# Patient Record
(demographics unavailable — no encounter records)

---

## 2025-01-17 NOTE — PHYSICAL EXAM
[Normocephalic] : normocephalic [Atraumatic] : atraumatic [Supple] : supple [No Supraclavicular Adenopathy] : no supraclavicular adenopathy [Examined in the supine and seated position] : examined in the supine and seated position [Asymmetrical] : asymmetrical [No dominant masses] : no dominant masses in right breast  [No dominant masses] : no dominant masses left breast [No Nipple Retraction] : no left nipple retraction [No Nipple Discharge] : no left nipple discharge [No Axillary Lymphadenopathy] : no left axillary lymphadenopathy [No Edema] : no edema [No Rashes] : no rashes [No Ulceration] : no ulceration [de-identified] : R>L [de-identified] : healed lateral incision, post bx change and slightly bruised [de-identified] : left nipple 12:00 area of dry skin resolved

## 2025-01-17 NOTE — ASSESSMENT
[FreeTextEntry1] : This is a 78 she will  history of left breast DCIS, estrogen positive  The pathology and imaging results were reviewed and discussed. She is a stage IA left breast cancer (T1bN0) HER2 negative, ER+/LA+ (AJCC 8thed).  The use of multimodality therapy for the treatment of breast cancer was discussed.   Surgical options were reviewed including a lumpectomy to negative margins, with whole breast radiation therapy versus a mastectomy with or without reconstruction. Included in this discussion with the results of the NSABP-04 and 06 trials.   Mastectomy techniques were discussed in detail including skin sparing and nipple sparing techniques. Recurrence was reviewed and that mastectomy does not confer a 100% risk reduction nor guarantee against breast cancer in the future.  Reconstructive options were briefly reviewed, including implant based versus tissue flap based reconstruction options.  Referral  to a plastic surgeon was offered.  The patient was informed that breast reconstruction is covered by insurance per state and federal laws.     Axillary staging was discussed. We reviewed the sentinel lymph node procedure, and how if the sentinel lymph node is found to have metastatic disease either on the intraoperative evaluation or on the final pathology, that an axillary dissection of the remaining lymph nodes may be recommended. It was explained that an axillary dissection takes "level one and level two" lymph nodes, and also "level three" if they feel clinically suspicious. It was explained that if the sentinel lymph node was not able to be found, that an axillary dissection may be necessary.  I reviewed the risks of lymphedema for SLND (6%) versus ALND (20%). I reviewed our lymphedema monitoring program. We discussed choosing wisely campaign and Sound and [place] Trial data to support SLNE omission. She will also be presented at [facility].  The Z-0011 study was touched upon, outlining that there is evidence that it may not be necessary to complete an axillary dissection in select patients even if one or two sentinel nodes are positive for cancer, as long as the cancer in the nodes is confined to within the nodes, the nodes aren't  matted down, and if the cancer meets certain criteria including being less than 5 cm, treated by lumpectomy and conventional whole breast radiation, and the patient is planning to take recommended adjuvant therapy, and didn't require preoperative chemotherapy.     The general indications for the use of adjuvant hormonal and chemotherapy and targeted therapies were discussed. It was explained that medical treatments are dependent on pathology results including receptor studies.  It was explained that some patients have further genetic testing of their tumors before a decision about chemotherapy can be made.  The indications for radiation therapy and side effects were also discussed including the use varying lengths of whole breast radiation.  It was explained that radiation is generally reserved for lumpectomy patients, and patients with larger tumors or positive lymph nodes. Common side effects were reviewed.   The genetics of breast cancer were discussed with the implications for risk of contralateral cancer and other associated cancers, implication for ovarian risk, options for management, and implications for family members. She consented for genetic testing today.  She is strongly motivated towards breast conserving surgery. She would like to meet with Dr. Wells to discuss symmetrizing surgery. I will call her with genetic testing results and go from there.

## 2025-01-17 NOTE — CONSULT LETTER
[Dear  ___] : Dear  [unfilled], [Courtesy Letter:] : I had the pleasure of seeing your patient, [unfilled], in my office today. [Please see my note below.] : Please see my note below. [Consult Closing:] : Thank you very much for allowing me to participate in the care of this patient.  If you have any questions, please do not hesitate to contact me. [Sincerely,] : Sincerely, [DrLink  ___] : Dr. HERNANDEZ [FreeTextEntry1] : She is newly diagnosed with left breast cancer. [FreeTextEntry3] : Candace Norwood MS DO\par  Breast Surgeon\par  [facility] \[place] [address]\par

## 2025-01-17 NOTE — HISTORY OF PRESENT ILLNESS
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 Patient is s/p RTX. Patient did not have hormonal therapy. She is positive for anticardiolipin antibody. Patient is s/p left excision biopsy for an intraductal papilloma in 2006. She also states she had an excisional biopsy on her right at [facility] around 1999. She is of [place] descent from [facility]. She has 3 children 1 son, 2 daughters, 2 grandchildren. She is a retired  who still works intermittently. Patient's daughter diagnosed with BCA at age of 49 in Dec 2015.   Patient has no breast complaints today.  She completed Moderna left arm March 2021. She is s/p right breast MRI bx retro 5/26/2021 pathology showed UDH, DSF, proliferative changes without atypia (buckle and stoplight marker). She had a fall in March 2021 at home and has been having right leg dysfunction. She is following with her orthopedic surgery Dr. Gee.  She has 3-4 espresso daily. She has lost her brother, brother-in-law, cousin, sister and sister-in law (passed of ALS). They passed of lung related issues due to heavy smoking. She is feeling, understandably, depressed from all this loss in 1 year.  She states she has no breast concerns today. Has musculoskeletal issues for which she is undergoing work up with Dr. Gee.  01/14/2025 - Lyn presents today to discuss newly diagnosed invasive ductal carcinoma of the left breast. Her screening mammogram and ultrasound for density (12/09/2024) showed heterogeneously dense breast tissue and a 7 x 7 mm developing mass in the left breast 3:00 N7 on mammogram. Ultrasound findings showed a left 3:00 N6 7 x 5 6 mm ill-defined hypoechoic mass. Ultrasound biopsy was recommended and done on 12/27/2024. Pathology of left 30:00 N6 (wing-shaped clip) showed IDC, grade 6/9, ER strongly positive in >90%, MS strongly positive in >90%, Her 2 negative and Ki67 11%.  Pathology also showed atypical lobular hyperplasia.  She presents with her son and daughter.   She does SBE occasionally.  She has not noticed a change in her breast or a breast lump. She has not noticed a change in her nipple or nipple area. She has not noticed a change in the skin of the breast. She is not experiencing nipple discharge. She is not experiencing breast pain. Patient complains of right breast occasional "pulling" She has not noticed a lump or lymph node under the armpit.   BREAST CANCER RISK FACTORS Menarche: 12 Menopause: 50 Grav:   3   Para: 3 Age at first live birth:20 Nursed:Y Hysterectomy: Y at 40 Oophorectomy:  OCP:N HRT: Y for 5 years Last pap/pelvic exam: 11/2019 WNL Related family history: daughter BCA @ 49 (lia), Martina bca @52yo Ashkenazi: N Matt-Ricardo/NCI lifetime risk: N/A BRCA testing: martina SEGAL genetics negative; micky (son) genetics negative  Last mammogram:  12/09/2024                             Location: [facility] Report reviewed. Results: Birads 4 The breasts are heterogeneously dense. Left 3:00 N7 7 x7 mm developing mass. Right breast WNL Arterial calcifications again present which could coronary artery disease.    Last ultrasound:  12/09/2025                             Location: [facility] Report reviewed Results: Birads 4 Left 3:00 N6 7 x 5 6 mm ill-defined hypoechoic mass correlating with mammogram findings. Rec: ultrasound biopsy  Last MRI:  5/5/2021                                            Location: [facility] Report Reviewed Results: right breast: new foci of retroareolar nodular enhancement; MR guided biopsy recommended. Fibrocystic changes. BIRADS 4 left breast: stable postsurgical changes. No suspicious findings. BIRADS 2

## 2025-01-17 NOTE — CONSULT LETTER
[Dear  ___] : Dear  [unfilled], [Courtesy Letter:] : I had the pleasure of seeing your patient, [unfilled], in my office today. [Please see my note below.] : Please see my note below. [Consult Closing:] : Thank you very much for allowing me to participate in the care of this patient.  If you have any questions, please do not hesitate to contact me. [Sincerely,] : Sincerely, [DrLink  ___] : Dr. HERNANDEZ [FreeTextEntry1] : She is newly diagnosed with left breast cancer. [FreeTextEntry3] : Candace Norwood MS DO\par  Breast Surgeon\par  [facility] \[place] [address]\par

## 2025-01-17 NOTE — CONSULT LETTER
[Dear  ___] : Dear  [unfilled], [Courtesy Letter:] : I had the pleasure of seeing your patient, [unfilled], in my office today. [Please see my note below.] : Please see my note below. [Consult Closing:] : Thank you very much for allowing me to participate in the care of this patient.  If you have any questions, please do not hesitate to contact me. [Sincerely,] : Sincerely, [DrLink  ___] : Dr. HERNANDEZ [FreeTextEntry1] : She is newly diagnosed with left breast cancer. [FreeTextEntry3] : Candace Norwood MS DO\par  Breast Surgeon\par  [facility] \[place] [address]\par

## 2025-01-17 NOTE — HISTORY OF PRESENT ILLNESS
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 Patient is s/p RTX. Patient did not have hormonal therapy. She is positive for anticardiolipin antibody. Patient is s/p left excision biopsy for an intraductal papilloma in 2006. She also states she had an excisional biopsy on her right at [facility] around 1999. She is of [place] descent from [facility]. She has 3 children 1 son, 2 daughters, 2 grandchildren. She is a retired  who still works intermittently. Patient's daughter diagnosed with BCA at age of 49 in Dec 2015.   Patient has no breast complaints today.  She completed Moderna left arm March 2021. She is s/p right breast MRI bx retro 5/26/2021 pathology showed UDH, DSF, proliferative changes without atypia (buckle and stoplight marker). She had a fall in March 2021 at home and has been having right leg dysfunction. She is following with her orthopedic surgery Dr. Gee.  She has 3-4 espresso daily. She has lost her brother, brother-in-law, cousin, sister and sister-in law (passed of ALS). They passed of lung related issues due to heavy smoking. She is feeling, understandably, depressed from all this loss in 1 year.  She states she has no breast concerns today. Has musculoskeletal issues for which she is undergoing work up with Dr. Gee.  01/14/2025 - Lyn presents today to discuss newly diagnosed invasive ductal carcinoma of the left breast. Her screening mammogram and ultrasound for density (12/09/2024) showed heterogeneously dense breast tissue and a 7 x 7 mm developing mass in the left breast 3:00 N7 on mammogram. Ultrasound findings showed a left 3:00 N6 7 x 5 6 mm ill-defined hypoechoic mass. Ultrasound biopsy was recommended and done on 12/27/2024. Pathology of left 30:00 N6 (wing-shaped clip) showed IDC, grade 6/9, ER strongly positive in >90%, NC strongly positive in >90%, Her 2 negative and Ki67 11%.  Pathology also showed atypical lobular hyperplasia.  She presents with her son and daughter.   She does SBE occasionally.  She has not noticed a change in her breast or a breast lump. She has not noticed a change in her nipple or nipple area. She has not noticed a change in the skin of the breast. She is not experiencing nipple discharge. She is not experiencing breast pain. Patient complains of right breast occasional "pulling" She has not noticed a lump or lymph node under the armpit.   BREAST CANCER RISK FACTORS Menarche: 12 Menopause: 50 Grav:   3   Para: 3 Age at first live birth:20 Nursed:Y Hysterectomy: Y at 40 Oophorectomy:  OCP:N HRT: Y for 5 years Last pap/pelvic exam: 11/2019 WNL Related family history: daughter BCA @ 49 (lia), Martina bca @52yo Ashkenazi: N Matt-Ricardo/NCI lifetime risk: N/A BRCA testing: martina SEGAL genetics negative; micky (son) genetics negative  Last mammogram:  12/09/2024                             Location: [facility] Report reviewed. Results: Birads 4 The breasts are heterogeneously dense. Left 3:00 N7 7 x7 mm developing mass. Right breast WNL Arterial calcifications again present which could coronary artery disease.    Last ultrasound:  12/09/2025                             Location: [facility] Report reviewed Results: Birads 4 Left 3:00 N6 7 x 5 6 mm ill-defined hypoechoic mass correlating with mammogram findings. Rec: ultrasound biopsy  Last MRI:  5/5/2021                                            Location: [facility] Report Reviewed Results: right breast: new foci of retroareolar nodular enhancement; MR guided biopsy recommended. Fibrocystic changes. BIRADS 4 left breast: stable postsurgical changes. No suspicious findings. BIRADS 2

## 2025-01-17 NOTE — HISTORY OF PRESENT ILLNESS
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 Patient is s/p RTX. Patient did not have hormonal therapy. She is positive for anticardiolipin antibody. Patient is s/p left excision biopsy for an intraductal papilloma in 2006. She also states she had an excisional biopsy on her right at [facility] around 1999. She is of [place] descent from [facility]. She has 3 children 1 son, 2 daughters, 2 grandchildren. She is a retired  who still works intermittently. Patient's daughter diagnosed with BCA at age of 49 in Dec 2015.   Patient has no breast complaints today.  She completed Moderna left arm March 2021. She is s/p right breast MRI bx retro 5/26/2021 pathology showed UDH, DSF, proliferative changes without atypia (buckle and stoplight marker). She had a fall in March 2021 at home and has been having right leg dysfunction. She is following with her orthopedic surgery Dr. Gee.  She has 3-4 espresso daily. She has lost her brother, brother-in-law, cousin, sister and sister-in law (passed of ALS). They passed of lung related issues due to heavy smoking. She is feeling, understandably, depressed from all this loss in 1 year.  She states she has no breast concerns today. Has musculoskeletal issues for which she is undergoing work up with Dr. Gee.  01/14/2025 - Lyn presents today to discuss newly diagnosed invasive ductal carcinoma of the left breast. Her screening mammogram and ultrasound for density (12/09/2024) showed heterogeneously dense breast tissue and a 7 x 7 mm developing mass in the left breast 3:00 N7 on mammogram. Ultrasound findings showed a left 3:00 N6 7 x 5 6 mm ill-defined hypoechoic mass. Ultrasound biopsy was recommended and done on 12/27/2024. Pathology of left 30:00 N6 (wing-shaped clip) showed IDC, grade 6/9, ER strongly positive in >90%, LA strongly positive in >90%, Her 2 negative and Ki67 11%.  Pathology also showed atypical lobular hyperplasia.  She presents with her son and daughter.   She does SBE occasionally.  She has not noticed a change in her breast or a breast lump. She has not noticed a change in her nipple or nipple area. She has not noticed a change in the skin of the breast. She is not experiencing nipple discharge. She is not experiencing breast pain. Patient complains of right breast occasional "pulling" She has not noticed a lump or lymph node under the armpit.   BREAST CANCER RISK FACTORS Menarche: 12 Menopause: 50 Grav:   3   Para: 3 Age at first live birth:20 Nursed:Y Hysterectomy: Y at 40 Oophorectomy:  OCP:N HRT: Y for 5 years Last pap/pelvic exam: 11/2019 WNL Related family history: daughter BCA @ 49 (lia), Martina bca @52yo Ashkenazi: N Matt-Ricardo/NCI lifetime risk: N/A BRCA testing: martina SEGAL genetics negative; micky (son) genetics negative  Last mammogram:  12/09/2024                             Location: [facility] Report reviewed. Results: Birads 4 The breasts are heterogeneously dense. Left 3:00 N7 7 x7 mm developing mass. Right breast WNL Arterial calcifications again present which could coronary artery disease.    Last ultrasound:  12/09/2025                             Location: [facility] Report reviewed Results: Birads 4 Left 3:00 N6 7 x 5 6 mm ill-defined hypoechoic mass correlating with mammogram findings. Rec: ultrasound biopsy  Last MRI:  5/5/2021                                            Location: [facility] Report Reviewed Results: right breast: new foci of retroareolar nodular enhancement; MR guided biopsy recommended. Fibrocystic changes. BIRADS 4 left breast: stable postsurgical changes. No suspicious findings. BIRADS 2

## 2025-01-17 NOTE — REVIEW OF SYSTEMS
[Feeling Tired] : feeling tired [Snoring] : snoring [Abdominal Pain] : abdominal pain [Joint Swelling] : joint swelling [Negative] : Heme/Lymph [FreeTextEntry5] : Blood clots

## 2025-01-17 NOTE — ASSESSMENT
[FreeTextEntry1] : This is a 78 she will  history of left breast DCIS, estrogen positive  The pathology and imaging results were reviewed and discussed. She is a stage IA left breast cancer (T1bN0) HER2 negative, ER+/DE+ (AJCC 8thed).  The use of multimodality therapy for the treatment of breast cancer was discussed.   Surgical options were reviewed including a lumpectomy to negative margins, with whole breast radiation therapy versus a mastectomy with or without reconstruction. Included in this discussion with the results of the NSABP-04 and 06 trials.   Mastectomy techniques were discussed in detail including skin sparing and nipple sparing techniques. Recurrence was reviewed and that mastectomy does not confer a 100% risk reduction nor guarantee against breast cancer in the future.  Reconstructive options were briefly reviewed, including implant based versus tissue flap based reconstruction options.  Referral  to a plastic surgeon was offered.  The patient was informed that breast reconstruction is covered by insurance per state and federal laws.     Axillary staging was discussed. We reviewed the sentinel lymph node procedure, and how if the sentinel lymph node is found to have metastatic disease either on the intraoperative evaluation or on the final pathology, that an axillary dissection of the remaining lymph nodes may be recommended. It was explained that an axillary dissection takes "level one and level two" lymph nodes, and also "level three" if they feel clinically suspicious. It was explained that if the sentinel lymph node was not able to be found, that an axillary dissection may be necessary.  I reviewed the risks of lymphedema for SLND (6%) versus ALND (20%). I reviewed our lymphedema monitoring program. We discussed choosing wisely campaign and Sound and [place] Trial data to support SLNE omission. She will also be presented at [facility].  The Z-0011 study was touched upon, outlining that there is evidence that it may not be necessary to complete an axillary dissection in select patients even if one or two sentinel nodes are positive for cancer, as long as the cancer in the nodes is confined to within the nodes, the nodes aren't  matted down, and if the cancer meets certain criteria including being less than 5 cm, treated by lumpectomy and conventional whole breast radiation, and the patient is planning to take recommended adjuvant therapy, and didn't require preoperative chemotherapy.     The general indications for the use of adjuvant hormonal and chemotherapy and targeted therapies were discussed. It was explained that medical treatments are dependent on pathology results including receptor studies.  It was explained that some patients have further genetic testing of their tumors before a decision about chemotherapy can be made.  The indications for radiation therapy and side effects were also discussed including the use varying lengths of whole breast radiation.  It was explained that radiation is generally reserved for lumpectomy patients, and patients with larger tumors or positive lymph nodes. Common side effects were reviewed.   The genetics of breast cancer were discussed with the implications for risk of contralateral cancer and other associated cancers, implication for ovarian risk, options for management, and implications for family members. She consented for genetic testing today.  She is strongly motivated towards breast conserving surgery. She would like to meet with Dr. Wells to discuss symmetrizing surgery. I will call her with genetic testing results and go from there.

## 2025-01-17 NOTE — ASSESSMENT
[FreeTextEntry1] : This is a 78 she will  history of left breast DCIS, estrogen positive  The pathology and imaging results were reviewed and discussed. She is a stage IA left breast cancer (T1bN0) HER2 negative, ER+/RI+ (AJCC 8thed).  The use of multimodality therapy for the treatment of breast cancer was discussed.   Surgical options were reviewed including a lumpectomy to negative margins, with whole breast radiation therapy versus a mastectomy with or without reconstruction. Included in this discussion with the results of the NSABP-04 and 06 trials.   Mastectomy techniques were discussed in detail including skin sparing and nipple sparing techniques. Recurrence was reviewed and that mastectomy does not confer a 100% risk reduction nor guarantee against breast cancer in the future.  Reconstructive options were briefly reviewed, including implant based versus tissue flap based reconstruction options.  Referral  to a plastic surgeon was offered.  The patient was informed that breast reconstruction is covered by insurance per state and federal laws.     Axillary staging was discussed. We reviewed the sentinel lymph node procedure, and how if the sentinel lymph node is found to have metastatic disease either on the intraoperative evaluation or on the final pathology, that an axillary dissection of the remaining lymph nodes may be recommended. It was explained that an axillary dissection takes "level one and level two" lymph nodes, and also "level three" if they feel clinically suspicious. It was explained that if the sentinel lymph node was not able to be found, that an axillary dissection may be necessary.  I reviewed the risks of lymphedema for SLND (6%) versus ALND (20%). I reviewed our lymphedema monitoring program. We discussed choosing wisely campaign and Sound and [place] Trial data to support SLNE omission. She will also be presented at [facility].  The Z-0011 study was touched upon, outlining that there is evidence that it may not be necessary to complete an axillary dissection in select patients even if one or two sentinel nodes are positive for cancer, as long as the cancer in the nodes is confined to within the nodes, the nodes aren't  matted down, and if the cancer meets certain criteria including being less than 5 cm, treated by lumpectomy and conventional whole breast radiation, and the patient is planning to take recommended adjuvant therapy, and didn't require preoperative chemotherapy.     The general indications for the use of adjuvant hormonal and chemotherapy and targeted therapies were discussed. It was explained that medical treatments are dependent on pathology results including receptor studies.  It was explained that some patients have further genetic testing of their tumors before a decision about chemotherapy can be made.  The indications for radiation therapy and side effects were also discussed including the use varying lengths of whole breast radiation.  It was explained that radiation is generally reserved for lumpectomy patients, and patients with larger tumors or positive lymph nodes. Common side effects were reviewed.   The genetics of breast cancer were discussed with the implications for risk of contralateral cancer and other associated cancers, implication for ovarian risk, options for management, and implications for family members. She consented for genetic testing today.  She is strongly motivated towards breast conserving surgery. She would like to meet with Dr. Wells to discuss symmetrizing surgery. I will call her with genetic testing results and go from there.

## 2025-01-17 NOTE — HISTORY OF PRESENT ILLNESS
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 Patient is s/p RTX. Patient did not have hormonal therapy. She is positive for anticardiolipin antibody. Patient is s/p left excision biopsy for an intraductal papilloma in 2006. She also states she had an excisional biopsy on her right at [facility] around 1999. She is of [place] descent from [facility]. She has 3 children 1 son, 2 daughters, 2 grandchildren. She is a retired  who still works intermittently. Patient's daughter diagnosed with BCA at age of 49 in Dec 2015.   Patient has no breast complaints today.  She completed Moderna left arm March 2021. She is s/p right breast MRI bx retro 5/26/2021 pathology showed UDH, DSF, proliferative changes without atypia (buckle and stoplight marker). She had a fall in March 2021 at home and has been having right leg dysfunction. She is following with her orthopedic surgery Dr. Gee.  She has 3-4 espresso daily. She has lost her brother, brother-in-law, cousin, sister and sister-in law (passed of ALS). They passed of lung related issues due to heavy smoking. She is feeling, understandably, depressed from all this loss in 1 year.  She states she has no breast concerns today. Has musculoskeletal issues for which she is undergoing work up with Dr. Gee.  01/14/2025 - Lyn presents today to discuss newly diagnosed invasive ductal carcinoma of the left breast. Her screening mammogram and ultrasound for density (12/09/2024) showed heterogeneously dense breast tissue and a 7 x 7 mm developing mass in the left breast 3:00 N7 on mammogram. Ultrasound findings showed a left 3:00 N6 7 x 5 6 mm ill-defined hypoechoic mass. Ultrasound biopsy was recommended and done on 12/27/2024. Pathology of left 30:00 N6 (wing-shaped clip) showed IDC, grade 6/9, ER strongly positive in >90%, ND strongly positive in >90%, Her 2 negative and Ki67 11%.  Pathology also showed atypical lobular hyperplasia.  She presents with her son and daughter.   She does SBE occasionally.  She has not noticed a change in her breast or a breast lump. She has not noticed a change in her nipple or nipple area. She has not noticed a change in the skin of the breast. She is not experiencing nipple discharge. She is not experiencing breast pain. Patient complains of right breast occasional "pulling" She has not noticed a lump or lymph node under the armpit.   BREAST CANCER RISK FACTORS Menarche: 12 Menopause: 50 Grav:   3   Para: 3 Age at first live birth:20 Nursed:Y Hysterectomy: Y at 40 Oophorectomy:  OCP:N HRT: Y for 5 years Last pap/pelvic exam: 11/2019 WNL Related family history: daughter BCA @ 49 (lia), Martina bca @54yo Ashkenazi: N Matt-Ricardo/NCI lifetime risk: N/A BRCA testing: martina SEGAL genetics negative; micky (son) genetics negative  Last mammogram:  12/09/2024                             [facility]: [facility] Report reviewed. Results: Birads 4 The breasts are heterogeneously dense. Left 3:00 N7 7 x7 mm developing mass. Right breast WNL Arterial calcifications again present which could coronary artery disease.    Last ultrasound:  12/09/2025                             Location: [facility] Report reviewed Results: Birads 4 Left 3:00 N6 7 x 5 6 mm ill-defined hypoechoic mass correlating with mammogram findings. Rec: ultrasound biopsy  Last MRI:  5/5/2021                                            Location: [facility] Report Reviewed Results: right breast: new foci of retroareolar nodular enhancement; MR guided biopsy recommended. Fibrocystic changes. BIRADS 4 left breast: stable postsurgical changes. No suspicious findings. BIRADS 2

## 2025-01-17 NOTE — PHYSICAL EXAM
[Normocephalic] : normocephalic [Atraumatic] : atraumatic [Supple] : supple [No Supraclavicular Adenopathy] : no supraclavicular adenopathy [Examined in the supine and seated position] : examined in the supine and seated position [Asymmetrical] : asymmetrical [No dominant masses] : no dominant masses in right breast  [No dominant masses] : no dominant masses left breast [No Nipple Retraction] : no left nipple retraction [No Nipple Discharge] : no left nipple discharge [No Axillary Lymphadenopathy] : no left axillary lymphadenopathy [No Edema] : no edema [No Rashes] : no rashes [No Ulceration] : no ulceration [de-identified] : R>L [de-identified] : healed lateral incision, post bx change and slightly bruised [de-identified] : left nipple 12:00 area of dry skin resolved

## 2025-01-17 NOTE — ASSESSMENT
[FreeTextEntry1] : This is a 78 she will  history of left breast DCIS, estrogen positive  The pathology and imaging results were reviewed and discussed. She is a stage IA left breast cancer (T1bN0) HER2 negative, ER+/MT+ (AJCC 8thed).  The use of multimodality therapy for the treatment of breast cancer was discussed.   Surgical options were reviewed including a lumpectomy to negative margins, with whole breast radiation therapy versus a mastectomy with or without reconstruction. Included in this discussion with the results of the NSABP-04 and 06 trials.   Mastectomy techniques were discussed in detail including skin sparing and nipple sparing techniques. Recurrence was reviewed and that mastectomy does not confer a 100% risk reduction nor guarantee against breast cancer in the future.  Reconstructive options were briefly reviewed, including implant based versus tissue flap based reconstruction options.  Referral  to a plastic surgeon was offered.  The patient was informed that breast reconstruction is covered by insurance per state and federal laws.     Axillary staging was discussed. We reviewed the sentinel lymph node procedure, and how if the sentinel lymph node is found to have metastatic disease either on the intraoperative evaluation or on the final pathology, that an axillary dissection of the remaining lymph nodes may be recommended. It was explained that an axillary dissection takes "level one and level two" lymph nodes, and also "level three" if they feel clinically suspicious. It was explained that if the sentinel lymph node was not able to be found, that an axillary dissection may be necessary.  I reviewed the risks of lymphedema for SLND (6%) versus ALND (20%). I reviewed our lymphedema monitoring program. We discussed choosing wisely campaign and Sound and [place] Trial data to support SLNE omission. She will also be presented at [facility].  The Z-0011 study was touched upon, outlining that there is evidence that it may not be necessary to complete an axillary dissection in select patients even if one or two sentinel nodes are positive for cancer, as long as the cancer in the nodes is confined to within the nodes, the nodes aren't  matted down, and if the cancer meets certain criteria including being less than 5 cm, treated by lumpectomy and conventional whole breast radiation, and the patient is planning to take recommended adjuvant therapy, and didn't require preoperative chemotherapy.     The general indications for the use of adjuvant hormonal and chemotherapy and targeted therapies were discussed. It was explained that medical treatments are dependent on pathology results including receptor studies.  It was explained that some patients have further genetic testing of their tumors before a decision about chemotherapy can be made.  The indications for radiation therapy and side effects were also discussed including the use varying lengths of whole breast radiation.  It was explained that radiation is generally reserved for lumpectomy patients, and patients with larger tumors or positive lymph nodes. Common side effects were reviewed.   The genetics of breast cancer were discussed with the implications for risk of contralateral cancer and other associated cancers, implication for ovarian risk, options for management, and implications for family members. She consented for genetic testing today.  She is strongly motivated towards breast conserving surgery. She would like to meet with Dr. Wells to discuss symmetrizing surgery. I will call her with genetic testing results and go from there.

## 2025-01-17 NOTE — PHYSICAL EXAM
[Normocephalic] : normocephalic [Atraumatic] : atraumatic [Supple] : supple [No Supraclavicular Adenopathy] : no supraclavicular adenopathy [Examined in the supine and seated position] : examined in the supine and seated position [Asymmetrical] : asymmetrical [No dominant masses] : no dominant masses in right breast  [No dominant masses] : no dominant masses left breast [No Nipple Retraction] : no left nipple retraction [No Nipple Discharge] : no left nipple discharge [No Axillary Lymphadenopathy] : no left axillary lymphadenopathy [No Edema] : no edema [No Rashes] : no rashes [No Ulceration] : no ulceration [de-identified] : R>L [de-identified] : healed lateral incision, post bx change and slightly bruised [de-identified] : left nipple 12:00 area of dry skin resolved

## 2025-01-17 NOTE — CONSULT LETTER
[Dear  ___] : Dear  [unfilled], [Courtesy Letter:] : I had the pleasure of seeing your patient, [unfilled], in my office today. [Please see my note below.] : Please see my note below. [Consult Closing:] : Thank you very much for allowing me to participate in the care of this patient.  If you have any questions, please do not hesitate to contact me. [Sincerely,] : Sincerely, [DrLink  ___] : Dr. HERNANDEZ [FreeTextEntry1] : She is newly diagnosed with left breast cancer. [FreeTextEntry3] : Candace Norwood MS DO\par  Breast Surgeon\par  [facility] \[place] [address]\par

## 2025-01-21 NOTE — HISTORY OF PRESENT ILLNESS
[FreeTextEntry1] : Ms. Napier presents for initial consultation to discuss breast reconstruction at the time of left partial mastectomy for newly diagnosed invasive ductal carcinoma of the left breast, referred by Dr. Norwood. She has had prior segmental resection and radiation treatments to the left breast 21 years ago. She is positive for anticardiolipin antibody, has a history of lower extremity DVT. Genetic testing is pending.   Left breast soft, higher than right breast   Plan: Today we discussed local tissue rearrangement at the time of left partial mastectomy. She wishes to decrease the scope of surgery and is not interested in right breast reduction for symmetry.  The nature of surgery, its risks, benefits, alternatives, expected postoperative course, recovery and long-term results were discussed in detail. All questions were answered. Will coordinate care with Dr. Norwood.

## 2025-02-08 NOTE — HISTORY OF PRESENT ILLNESS
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 Patient is s/p RTX. Patient did not have hormonal therapy. She is positive for anticardiolipin antibody. Patient is s/p left excision biopsy for an intraductal papilloma in 2006. She also states she had an excisional biopsy on her right at [facility] around 1999. She is of [place] descent from [facility]. She has 3 children 1 son, 2 daughters, 2 grandchildren. She is a retired  who still works intermittently. Patient's daughter diagnosed with BCA at age of 49 in Dec 2015.   Patient has no breast complaints today.  She completed Moderna left arm March 2021. She is s/p right breast MRI bx retro 5/26/2021 pathology showed UDH, DSF, proliferative changes without atypia (buckle and stoplight marker). She had a fall in March 2021 at home and has been having right leg dysfunction. She is following with her orthopedic surgery Dr. Gee.  She has 3-4 espresso daily. She has lost her brother, brother-in-law, cousin, sister and sister-in law (passed of ALS). They passed of lung related issues due to heavy smoking. She is feeling, understandably, depressed from all this loss in 1 year.  She states she has no breast concerns today. Has musculoskeletal issues for which she is undergoing work up with Dr. Gee.  01/14/2025 - Lyn presents today to discuss newly diagnosed invasive ductal carcinoma of the left breast. Her screening mammogram and ultrasound for density (12/09/2024) showed heterogeneously dense breast tissue and a 7 x 7 mm developing mass in the left breast 3:00 N7 on mammogram. Ultrasound findings showed a left 3:00 N6 7 x 5 6 mm ill-defined hypoechoic mass. Ultrasound biopsy was recommended and done on 12/27/2024. Pathology of left 30:00 N6 (wing-shaped clip) showed IDC, grade 6/9, ER strongly positive in >90%, CT strongly positive in >90%, Her 2 negative and Ki67 11%.  Pathology also showed atypical lobular hyperplasia.    She is now s/p Left Pmtx/slne on 1/30/2025. Final pathology reported as 0/2 LN, IDC, 11mm, angio-lymphatic invasion seen, DCIS intermediated grade, LCIS, margins negative, ER/CT+, Her2-, Ki67 5-10%.  She is doing well postop. she has occasionally felt a right breast pulling.  She does SBE occasionally.  She has not noticed a change in her breast or a breast lump. She has not noticed a change in her nipple or nipple area. She has not noticed a change in the skin of the breast. She is not experiencing nipple discharge. She is not experiencing breast pain. Patient complains of right breast occasional "pulling" She has not noticed a lump or lymph node under the armpit.   BREAST CANCER RISK FACTORS Menarche: 12 Menopause: 50 Grav:   3   Para: 3 Age at first live birth:20 Nursed:Y Hysterectomy: Y at 40 Oophorectomy:  OCP:N HRT: Y for 5 years Last pap/pelvic exam: 11/2019 WNL Related family history: daughter BCA @ 49 (lia), Martina bca @54yo Ashkenazi: N Matt-Ricardo/NCI lifetime risk: N/A BRCA testing: martina SEGAL genetics negative; micky (son) genetics negative  Last mammogram:  12/09/2024                             Location: [facility] Report reviewed. Results: Birads 4 The breasts are heterogeneously dense. Left 3:00 N7 7 x7 mm developing mass. Right breast WNL Arterial calcifications again present which could coronary artery disease.    Last ultrasound:  12/09/2025                             Location: [facility] Report reviewed Results: Birads 4 Left 3:00 N6 7 x 5 6 mm ill-defined hypoechoic mass correlating with mammogram findings. Rec: ultrasound biopsy  Last MRI:  5/5/2021                                            Location: [facility] Report Reviewed Results: right breast: new foci of retroareolar nodular enhancement; MR guided biopsy recommended. Fibrocystic changes. BIRADS 4 left breast: stable postsurgical changes. No suspicious findings. BIRADS 2

## 2025-02-08 NOTE — HISTORY OF PRESENT ILLNESS
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 Patient is s/p RTX. Patient did not have hormonal therapy. She is positive for anticardiolipin antibody. Patient is s/p left excision biopsy for an intraductal papilloma in 2006. She also states she had an excisional biopsy on her right at [facility] around 1999. She is of [place] descent from [facility]. She has 3 children 1 son, 2 daughters, 2 grandchildren. She is a retired  who still works intermittently. Patient's daughter diagnosed with BCA at age of 49 in Dec 2015.   Patient has no breast complaints today.  She completed Moderna left arm March 2021. She is s/p right breast MRI bx retro 5/26/2021 pathology showed UDH, DSF, proliferative changes without atypia (buckle and stoplight marker). She had a fall in March 2021 at home and has been having right leg dysfunction. She is following with her orthopedic surgery Dr. Gee.  She has 3-4 espresso daily. She has lost her brother, brother-in-law, cousin, sister and sister-in law (passed of ALS). They passed of lung related issues due to heavy smoking. She is feeling, understandably, depressed from all this loss in 1 year.  She states she has no breast concerns today. Has musculoskeletal issues for which she is undergoing work up with Dr. Gee.  01/14/2025 - Lyn presents today to discuss newly diagnosed invasive ductal carcinoma of the left breast. Her screening mammogram and ultrasound for density (12/09/2024) showed heterogeneously dense breast tissue and a 7 x 7 mm developing mass in the left breast 3:00 N7 on mammogram. Ultrasound findings showed a left 3:00 N6 7 x 5 6 mm ill-defined hypoechoic mass. Ultrasound biopsy was recommended and done on 12/27/2024. Pathology of left 30:00 N6 (wing-shaped clip) showed IDC, grade 6/9, ER strongly positive in >90%, ND strongly positive in >90%, Her 2 negative and Ki67 11%.  Pathology also showed atypical lobular hyperplasia.    She is now s/p Left Pmtx/slne on 1/30/2025. Final pathology reported as 0/2 LN, IDC, 11mm, angio-lymphatic invasion seen, DCIS intermediated grade, LCIS, margins negative, ER/ND+, Her2-, Ki67 5-10%.  She is doing well postop. she has occasionally felt a right breast pulling.  She does SBE occasionally.  She has not noticed a change in her breast or a breast lump. She has not noticed a change in her nipple or nipple area. She has not noticed a change in the skin of the breast. She is not experiencing nipple discharge. She is not experiencing breast pain. Patient complains of right breast occasional "pulling" She has not noticed a lump or lymph node under the armpit.   BREAST CANCER RISK FACTORS Menarche: 12 Menopause: 50 Grav:   3   Para: 3 Age at first live birth:20 Nursed:Y Hysterectomy: Y at 40 Oophorectomy:  OCP:N HRT: Y for 5 years Last pap/pelvic exam: 11/2019 WNL Related family history: daughter BCA @ 49 (lia), Martina bca @54yo Ashkenazi: N Matt-Ricardo/NCI lifetime risk: N/A BRCA testing: martina SEGAL genetics negative; micky (son) genetics negative  Last mammogram:  12/09/2024                             Location: [facility] Report reviewed. Results: Birads 4 The breasts are heterogeneously dense. Left 3:00 N7 7 x7 mm developing mass. Right breast WNL Arterial calcifications again present which could coronary artery disease.    Last ultrasound:  12/09/2025                             Location: [facility] Report reviewed Results: Birads 4 Left 3:00 N6 7 x 5 6 mm ill-defined hypoechoic mass correlating with mammogram findings. Rec: ultrasound biopsy  Last MRI:  5/5/2021                                            Location: [facility] Report Reviewed Results: right breast: new foci of retroareolar nodular enhancement; MR guided biopsy recommended. Fibrocystic changes. BIRADS 4 left breast: stable postsurgical changes. No suspicious findings. BIRADS 2

## 2025-02-08 NOTE — PHYSICAL EXAM
[de-identified] : healing pmx site, prineo in place, healing axillary incision prineo in place, no collections, no redness

## 2025-02-08 NOTE — PHYSICAL EXAM
[de-identified] : healing pmx site, prineo in place, healing axillary incision prineo in place, no collections, no redness

## 2025-02-08 NOTE — PHYSICAL EXAM
[de-identified] : healing pmx site, prineo in place, healing axillary incision prineo in place, no collections, no redness

## 2025-02-08 NOTE — HISTORY OF PRESENT ILLNESS
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 Patient is s/p RTX. Patient did not have hormonal therapy. She is positive for anticardiolipin antibody. Patient is s/p left excision biopsy for an intraductal papilloma in 2006. She also states she had an excisional biopsy on her right at [facility] around 1999. She is of [place] descent from [facility]. She has 3 children 1 son, 2 daughters, 2 grandchildren. She is a retired  who still works intermittently. Patient's daughter diagnosed with BCA at age of 49 in Dec 2015.   Patient has no breast complaints today.  She completed Moderna left arm March 2021. She is s/p right breast MRI bx retro 5/26/2021 pathology showed UDH, DSF, proliferative changes without atypia (buckle and stoplight marker). She had a fall in March 2021 at home and has been having right leg dysfunction. She is following with her orthopedic surgery Dr. Gee.  She has 3-4 espresso daily. She has lost her brother, brother-in-law, cousin, sister and sister-in law (passed of ALS). They passed of lung related issues due to heavy smoking. She is feeling, understandably, depressed from all this loss in 1 year.  She states she has no breast concerns today. Has musculoskeletal issues for which she is undergoing work up with Dr. Gee.  01/14/2025 - Lyn presents today to discuss newly diagnosed invasive ductal carcinoma of the left breast. Her screening mammogram and ultrasound for density (12/09/2024) showed heterogeneously dense breast tissue and a 7 x 7 mm developing mass in the left breast 3:00 N7 on mammogram. Ultrasound findings showed a left 3:00 N6 7 x 5 6 mm ill-defined hypoechoic mass. Ultrasound biopsy was recommended and done on 12/27/2024. Pathology of left 30:00 N6 (wing-shaped clip) showed IDC, grade 6/9, ER strongly positive in >90%, OR strongly positive in >90%, Her 2 negative and Ki67 11%.  Pathology also showed atypical lobular hyperplasia.    She is now s/p Left Pmtx/slne on 1/30/2025. Final pathology reported as 0/2 LN, IDC, 11mm, angio-lymphatic invasion seen, DCIS intermediated grade, LCIS, margins negative, ER/OR+, Her2-, Ki67 5-10%.  She is doing well postop. she has occasionally felt a right breast pulling.  She does SBE occasionally.  She has not noticed a change in her breast or a breast lump. She has not noticed a change in her nipple or nipple area. She has not noticed a change in the skin of the breast. She is not experiencing nipple discharge. She is not experiencing breast pain. Patient complains of right breast occasional "pulling" She has not noticed a lump or lymph node under the armpit.   BREAST CANCER RISK FACTORS Menarche: 12 Menopause: 50 Grav:   3   Para: 3 Age at first live birth:20 Nursed:Y Hysterectomy: Y at 40 Oophorectomy:  OCP:N HRT: Y for 5 years Last pap/pelvic exam: 11/2019 WNL Related family history: daughter BCA @ 49 (lia), Martina bca @52yo Ashkenazi: N Matt-Ricardo/NCI lifetime risk: N/A BRCA testing: martina SEGAL genetics negative; micky (son) genetics negative  Last mammogram:  12/09/2024                             Location: [facility] Report reviewed. Results: Birads 4 The breasts are heterogeneously dense. Left 3:00 N7 7 x7 mm developing mass. Right breast WNL Arterial calcifications again present which could coronary artery disease.    Last ultrasound:  12/09/2025                             Location: [facility] Report reviewed Results: Birads 4 Left 3:00 N6 7 x 5 6 mm ill-defined hypoechoic mass correlating with mammogram findings. Rec: ultrasound biopsy  Last MRI:  5/5/2021                                            Location: [facility] Report Reviewed Results: right breast: new foci of retroareolar nodular enhancement; MR guided biopsy recommended. Fibrocystic changes. BIRADS 4 left breast: stable postsurgical changes. No suspicious findings. BIRADS 2

## 2025-02-08 NOTE — ASSESSMENT
[FreeTextEntry1] : doing well path reviewed copy given rec med/onc cs and rad/onc cs as scheduled  f/u 1 month She knows to call or return sooner should any concerns or questions arise.

## 2025-02-10 NOTE — HISTORY OF PRESENT ILLNESS
[FreeTextEntry1] : 77 y/o female presents 12 days s/p left breast closure after left partial mastectomy with Dr. Ireland on 01/30/2025.

## 2025-02-12 NOTE — PHYSICAL EXAM
Detail Level: Detailed [Normocephalic] : normocephalic [Atraumatic] : atraumatic [Supple] : supple [No Supraclavicular Adenopathy] : no supraclavicular adenopathy [Examined in the supine and seated position] : examined in the supine and seated position [Asymmetrical] : asymmetrical [No dominant masses] : no dominant masses in right breast  [No dominant masses] : no dominant masses left breast [No Nipple Retraction] : no left nipple retraction [No Nipple Discharge] : no left nipple discharge [No Axillary Lymphadenopathy] : no left axillary lymphadenopathy [No Edema] : no edema [No Rashes] : no rashes [No Ulceration] : no ulceration [de-identified] : R>L [de-identified] : healed lateral incision, post bx change and slightly bruised [de-identified] : left nipple 12:00 area of dry skin resolved

## 2025-02-19 NOTE — PHYSICAL EXAM
[Sclera] : the sclera and conjunctiva were normal [Extraocular Movements] : extraocular movements were intact [Outer Ear] : the ears and nose were normal in appearance [Hearing Threshold Finger Rub Not [place]] : hearing was normal [] : no respiratory distress [Exaggerated Use Of Accessory Muscles For Inspiration] : no accessory muscle use [Arterial Pulses Normal] : the arterial pulses were normal [Edema] : no peripheral edema present [Nondistended] : nondistended [Abdomen Tenderness] : non-tender [Nail Clubbing] : no clubbing  or cyanosis of the fingernails [Normal] : oriented to person, place and time, the affect was normal, the mood was normal and not anxious [Supraclavicular Lymph Nodes Enlarged Bilaterally] : supraclavicular [Axillary Lymph Nodes Enlarged Bilaterally] : axillary [de-identified] : well healing surgical incision of the L outer breast with no erythema or drainage. L breast slightly smaller than R with induration around the incision and darkening under skin folds consistent with previous RT course

## 2025-02-19 NOTE — HISTORY OF PRESENT ILLNESS
[FreeTextEntry1] : Ms. Napier is a 78-year-old female with PMH of left breast DCIS intermediate grade s/p partial mastectomy ([facility]) & radiation ([facility]) in 2004. She did not have hormonal therapy. Patient is positive for anticardiolipin antibody. She is s/p left excision biopsy for an intraductal papilloma in 2006. Patient's daughter diagnosed with BCA at age of 49 in Dec 2015. Ms. Napier is now newly diagnosed with left breast invasive carcinoma s/p left partial mastectomy & SLNB with reconstruction.   12/09/24 Bilateral mammogram ([facility]) showed a left breast  developing mass at 3:00. Right breast with no suspicious findings.   12/27/24 Left breast USGB ([facility]) revealed a moderately differentiated invasive ductal carcinoma measuring 0.4cm. Atypical lobular hyperplasia is also present. ER/[place]+ HER2-, Ki-67 11%   01/30/25 Left partial mastectomy with Dr. Norwood with tissue rearrangement with Dr. Wells. Pathology revealed a 1.1cm invasive ductal carcinoma, overall grade 2. Invasive carcinoma is 3.5mm from the closest anterior edge, 5mm from the closest medial edge & 7mm from the closest superior edge. Intermediate grade DCIS is present in cribriform & solid patterns. Closest margin is <1mm from the medial edge, 4mm from the posterior edge. Margin resections are negative for carcinoma. LCIS & LVI present. 2 lymph nodes negative. ER/NH+ HER2-, Ki-67 5-10%   Genetic testing negative.  02/19/25 Ms. Napier presents today to discuss the role of radiation in her management. She is almost 3 weeks s/p lumpectomy and is healing well. She denies breast pain but is having some mild difficulty with her ROM.

## 2025-02-19 NOTE — HISTORY OF PRESENT ILLNESS
[FreeTextEntry1] : Ms. Napier is a 78-year-old female with PMH of left breast DCIS intermediate grade s/p partial mastectomy ([facility]) & radiation ([facility]) in 2004. She did not have hormonal therapy. Patient is positive for anticardiolipin antibody. She is s/p left excision biopsy for an intraductal papilloma in 2006. Patient's daughter diagnosed with BCA at age of 49 in Dec 2015. Ms. Napier is now newly diagnosed with left breast invasive carcinoma s/p left partial mastectomy & SLNB with reconstruction.   12/09/24 Bilateral mammogram ([facility]) showed a left breast  developing mass at 3:00. Right breast with no suspicious findings.   12/27/24 Left breast USGB ([facility]) revealed a moderately differentiated invasive ductal carcinoma measuring 0.4cm. Atypical lobular hyperplasia is also present. ER/[place]+ HER2-, Ki-67 11%   01/30/25 Left partial mastectomy with Dr. Norwood with tissue rearrangement with Dr. Wells. Pathology revealed a 1.1cm invasive ductal carcinoma, overall grade 2. Invasive carcinoma is 3.5mm from the closest anterior edge, 5mm from the closest medial edge & 7mm from the closest superior edge. Intermediate grade DCIS is present in cribriform & solid patterns. Closest margin is <1mm from the medial edge, 4mm from the posterior edge. Margin resections are negative for carcinoma. LCIS & LVI present. 2 lymph nodes negative. ER/[place]+ HER2-, Ki-67 5-10%   Genetic testing negative.  02/19/25 Ms. Napier presents today to discuss the role of radiation in her management. She is almost 3 weeks s/p lumpectomy and is healing well. She denies breast pain but is having some mild difficulty with her ROM.

## 2025-02-19 NOTE — PHYSICAL EXAM
[Sclera] : the sclera and conjunctiva were normal [Extraocular Movements] : extraocular movements were intact [Outer Ear] : the ears and nose were normal in appearance [Hearing Threshold Finger Rub Not [place]] : hearing was normal [] : no respiratory distress [Exaggerated Use Of Accessory Muscles For Inspiration] : no accessory muscle use [Arterial Pulses Normal] : the arterial pulses were normal [Edema] : no peripheral edema present [Nondistended] : nondistended [Abdomen Tenderness] : non-tender [Nail Clubbing] : no clubbing  or cyanosis of the fingernails [Normal] : oriented to person, place and time, the affect was normal, the mood was normal and not anxious [Supraclavicular Lymph Nodes Enlarged Bilaterally] : supraclavicular [Axillary Lymph Nodes Enlarged Bilaterally] : axillary [de-identified] : well healing surgical incision of the L outer breast with no erythema or drainage. L breast slightly smaller than R with induration around the incision and darkening under skin folds consistent with previous RT course

## 2025-02-19 NOTE — PHYSICAL EXAM
[Sclera] : the sclera and conjunctiva were normal [Extraocular Movements] : extraocular movements were intact [Outer Ear] : the ears and nose were normal in appearance [Hearing Threshold Finger Rub Not [place]] : hearing was normal [] : no respiratory distress [Exaggerated Use Of Accessory Muscles For Inspiration] : no accessory muscle use [Arterial Pulses Normal] : the arterial pulses were normal [Edema] : no peripheral edema present [Nondistended] : nondistended [Abdomen Tenderness] : non-tender [Nail Clubbing] : no clubbing  or cyanosis of the fingernails [Normal] : oriented to person, place and time, the affect was normal, the mood was normal and not anxious [Supraclavicular Lymph Nodes Enlarged Bilaterally] : supraclavicular [Axillary Lymph Nodes Enlarged Bilaterally] : axillary [de-identified] : well healing surgical incision of the L outer breast with no erythema or drainage. L breast slightly smaller than R with induration around the incision and darkening under skin folds consistent with previous RT course

## 2025-02-19 NOTE — VITALS
[Maximal Pain Intensity: 0/10] : 0/10 [Least Pain Intensity: 0/10] : 0/10 [90: Able to carry normal activity; minor signs or symptoms of disease.] : 90: Able to carry normal activity; minor signs or symptoms of disease.  [Date: ____________] : Patient's last distress assessment performed on [unfilled]. [6 - Distress Level] : Distress Level: 6 [80: Normal activity with effort; some signs or symptoms of disease.] : 80: Normal activity with effort; some signs or symptoms of disease.

## 2025-02-19 NOTE — HISTORY OF PRESENT ILLNESS
[FreeTextEntry1] : Ms. Napier is a 78-year-old female with PMH of left breast DCIS intermediate grade s/p partial mastectomy ([facility]) & radiation ([facility]) in 2004. She did not have hormonal therapy. Patient is positive for anticardiolipin antibody. She is s/p left excision biopsy for an intraductal papilloma in 2006. Patient's daughter diagnosed with BCA at age of 49 in Dec 2015. Ms. Napier is now newly diagnosed with left breast invasive carcinoma s/p left partial mastectomy & SLNB with reconstruction.   12/09/24 Bilateral mammogram ([facility]) showed a left breast  developing mass at 3:00. Right breast with no suspicious findings.   12/27/24 Left breast USGB ([facility]) revealed a moderately differentiated invasive ductal carcinoma measuring 0.4cm. Atypical lobular hyperplasia is also present. ER/[place]+ HER2-, Ki-67 11%   01/30/25 Left partial mastectomy with Dr. Norwood with tissue rearrangement with Dr. Wells. Pathology revealed a 1.1cm invasive ductal carcinoma, overall grade 2. Invasive carcinoma is 3.5mm from the closest anterior edge, 5mm from the closest medial edge & 7mm from the closest superior edge. Intermediate grade DCIS is present in cribriform & solid patterns. Closest margin is <1mm from the medial edge, 4mm from the posterior edge. Margin resections are negative for carcinoma. LCIS & LVI present. 2 lymph nodes negative. ER/[place]+ HER2-, Ki-67 5-10%   Genetic testing negative.  02/19/25 Ms. Napier presents today to discuss the role of radiation in her management. She is almost 3 weeks s/p lumpectomy and is healing well. She denies breast pain but is having some mild difficulty with her ROM.

## 2025-03-07 NOTE — HISTORY OF PRESENT ILLNESS
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 Patient is s/p RTX. Patient did not have hormonal therapy. She is positive for anticardiolipin antibody. Patient is s/p left excision biopsy for an intraductal papilloma in 2006. She also states she had an excisional biopsy on her right at [facility] around 1999. She is of [place] descent from [facility]. She has 3 children 1 son, 2 daughters, 2 grandchildren. She is a retired  who still works intermittently. Patient's daughter diagnosed with BCA at age of 49 in Dec 2015.   Patient has no breast complaints today.  She completed Moderna left arm March 2021. She is s/p right breast MRI bx retro 5/26/2021 pathology showed UDH, DSF, proliferative changes without atypia (buckle and stoplight marker). She had a fall in March 2021 at home and has been having right leg dysfunction. She is following with her orthopedic surgery Dr. Gee.  She has 3-4 espresso daily. She has lost her brother, brother-in-law, cousin, sister and sister-in law (passed of ALS). They passed of lung related issues due to heavy smoking. She is feeling, understandably, depressed from all this loss in 1 year.  She states she has no breast concerns today. Has musculoskeletal issues for which she is undergoing work up with Dr. Gee.  01/14/2025 - Lyn was diagnsoed with invasive ductal carcinoma of the left breast. Her screening mammogram and ultrasound for density (12/09/2024) showed heterogeneously dense breast tissue and a 7 x 7 mm developing mass in the left breast 3:00 N7 on mammogram. Ultrasound findings showed a left 3:00 N6 7 x 5 6 mm ill-defined hypoechoic mass. Ultrasound biopsy was recommended and done on 12/27/2024. Pathology of left 30:00 N6 (wing-shaped clip) showed IDC, grade 6/9, ER strongly positive in >90%, [place] strongly positive in >90%, Her 2 negative and Ki67 11%.  Pathology also showed atypical lobular hyperplasia.   She is now s/p Left Pmtx/slne on 1/30/2025. Final pathology reported as 0/2 LN, IDC, 11mm, angio-lymphatic invasion seen, DCIS intermediated grade, LCIS, margins negative, ER/[place]+, Her2-, Ki67 5-10%.  She is doing well postop. She was seen by Dr. Benjamin and RT was recommended: simulation scan followed by 4005 in 15 fractions to the partial left UOQ breast.  She is c/o fatigue. She is still thinking about endocrine therapy.  She does SBE occasionally.  She has not noticed a change in her breast or a breast lump. She has not noticed a change in her nipple or nipple area. She has not noticed a change in the skin of the breast. She is not experiencing nipple discharge. She is not experiencing breast pain. Patient complains of right breast occasional "pulling" She has not noticed a lump or lymph node under the armpit.   BREAST CANCER RISK FACTORS Menarche: 12 Menopause: 50 Grav:   3   Para: 3 Age at first live birth:20 Nursed:Y Hysterectomy: Y at 40 Oophorectomy:  OCP:N HRT: Y for 5 years Last pap/pelvic exam: 11/2019 WNL Related family history: daughter BCA @ 49 (lia), Martina bca @52yo Ashkenazi: N Matt-Ricardo/NCI lifetime risk: N/A BRCA testing: martina SEGAL genetics negative; micky (son) genetics negative  Last mammogram:  12/09/2024                             Location: [facility] Report reviewed. Results: Birads 4 The breasts are heterogeneously dense. Left 3:00 N7 7 x7 mm developing mass. Right breast WNL Arterial calcifications again present which could coronary artery disease.    Last ultrasound:  12/09/2025                             Location: [facility] Report reviewed Results: Birads 4 Left 3:00 N6 7 x 5 6 mm ill-defined hypoechoic mass correlating with mammogram findings. Rec: ultrasound biopsy  Last MRI:  5/5/2021                                            Location: [facility] Report Reviewed Results: right breast: new foci of retroareolar nodular enhancement; MR guided biopsy recommended. Fibrocystic changes. BIRADS 4 left breast: stable postsurgical changes. No suspicious findings. BIRADS 2

## 2025-03-07 NOTE — CONSULT LETTER
[Dear  ___] : Dear  [unfilled], [Courtesy Letter:] : I had the pleasure of seeing your patient, [unfilled], in my office today. [Please see my note below.] : Please see my note below. [Consult Closing:] : Thank you very much for allowing me to participate in the care of this patient.  If you have any questions, please do not hesitate to contact me. [Sincerely,] : Sincerely, [FreeTextEntry3] : Candace Norwood [place] [address]

## 2025-03-07 NOTE — CONSULT LETTER
[Dear  ___] : Dear  [unfilled], [Courtesy Letter:] : I had the pleasure of seeing your patient, [unfilled], in my office today. [Please see my note below.] : Please see my note below. [Consult Closing:] : Thank you very much for allowing me to participate in the care of this patient.  If you have any questions, please do not hesitate to contact me. [Sincerely,] : Sincerely, [FreeTextEntry3] : Candace Norwood MS DO [place] [address]

## 2025-03-07 NOTE — ASSESSMENT
[FreeTextEntry1] : Pt doing well post op Will start RT with Dr. Benjamin  discussed endocrine therapy, she is considering taking this f/u 3 months She knows to call or return sooner should any concerns or questions arise.

## 2025-03-07 NOTE — CONSULT LETTER
[Dear  ___] : Dear  [unfilled], [Courtesy Letter:] : I had the pleasure of seeing your patient, [unfilled], in my office today. [Please see my note below.] : Please see my note below. [Consult Closing:] : Thank you very much for allowing me to participate in the care of this patient.  If you have any questions, please do not hesitate to contact me. [Sincerely,] : Sincerely, [FreeTextEntry3] : Candace Norwood [place] [address]

## 2025-03-07 NOTE — HISTORY OF PRESENT ILLNESS
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 Patient is s/p RTX. Patient did not have hormonal therapy. She is positive for anticardiolipin antibody. Patient is s/p left excision biopsy for an intraductal papilloma in 2006. She also states she had an excisional biopsy on her right at [facility] around 1999. She is of [place] descent from [facility]. She has 3 children 1 son, 2 daughters, 2 grandchildren. She is a retired  who still works intermittently. Patient's daughter diagnosed with BCA at age of 49 in Dec 2015.   Patient has no breast complaints today.  She completed Moderna left arm March 2021. She is s/p right breast MRI bx retro 5/26/2021 pathology showed UDH, DSF, proliferative changes without atypia (buckle and stoplight marker). She had a fall in March 2021 at home and has been having right leg dysfunction. She is following with her orthopedic surgery Dr. Gee.  She has 3-4 espresso daily. She has lost her brother, brother-in-law, cousin, sister and sister-in law (passed of ALS). They passed of lung related issues due to heavy smoking. She is feeling, understandably, depressed from all this loss in 1 year.  She states she has no breast concerns today. Has musculoskeletal issues for which she is undergoing work up with Dr. Gee.  01/14/2025 - Lyn was diagnsoed with invasive ductal carcinoma of the left breast. Her screening mammogram and ultrasound for density (12/09/2024) showed heterogeneously dense breast tissue and a 7 x 7 mm developing mass in the left breast 3:00 N7 on mammogram. Ultrasound findings showed a left 3:00 N6 7 x 5 6 mm ill-defined hypoechoic mass. Ultrasound biopsy was recommended and done on 12/27/2024. Pathology of left 30:00 N6 (wing-shaped clip) showed IDC, grade 6/9, ER strongly positive in >90%, IN strongly positive in >90%, Her 2 negative and Ki67 11%.  Pathology also showed atypical lobular hyperplasia.   She is now s/p Left Pmtx/slne on 1/30/2025. Final pathology reported as 0/2 LN, IDC, 11mm, angio-lymphatic invasion seen, DCIS intermediated grade, LCIS, margins negative, ER/IN+, Her2-, Ki67 5-10%.  She is doing well postop. She was seen by Dr. Benjamin and RT was recommended: simulation scan followed by 4005 in 15 fractions to the partial left UOQ breast.  She is c/o fatigue. She is still thinking about endocrine therapy.  She does SBE occasionally.  She has not noticed a change in her breast or a breast lump. She has not noticed a change in her nipple or nipple area. She has not noticed a change in the skin of the breast. She is not experiencing nipple discharge. She is not experiencing breast pain. Patient complains of right breast occasional "pulling" She has not noticed a lump or lymph node under the armpit.   BREAST CANCER RISK FACTORS Menarche: 12 Menopause: 50 Grav:   3   Para: 3 Age at first live birth:20 Nursed:Y Hysterectomy: Y at 40 Oophorectomy:  OCP:N HRT: Y for 5 years Last pap/pelvic exam: 11/2019 WNL Related family history: daughter BCA @ 49 (lia), Martina bca @54yo Ashkenazi: N Matt-Ricardo/NCI lifetime risk: N/A BRCA testing: martina SEGAL genetics negative; micky (son) genetics negative  Last mammogram:  12/09/2024                             Location: [facility] Report reviewed. Results: Birads 4 The breasts are heterogeneously dense. Left 3:00 N7 7 x7 mm developing mass. Right breast WNL Arterial calcifications again present which could coronary artery disease.    Last ultrasound:  12/09/2025                             Location: [facility] Report reviewed Results: Birads 4 Left 3:00 N6 7 x 5 6 mm ill-defined hypoechoic mass correlating with mammogram findings. Rec: ultrasound biopsy  Last MRI:  5/5/2021                                            Location: [facility] Report Reviewed Results: right breast: new foci of retroareolar nodular enhancement; MR guided biopsy recommended. Fibrocystic changes. BIRADS 4 left breast: stable postsurgical changes. No suspicious findings. BIRADS 2

## 2025-03-07 NOTE — HISTORY OF PRESENT ILLNESS
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 Patient is s/p RTX. Patient did not have hormonal therapy. She is positive for anticardiolipin antibody. Patient is s/p left excision biopsy for an intraductal papilloma in 2006. She also states she had an excisional biopsy on her right at [facility] around 1999. She is of [place] descent from [facility]. She has 3 children 1 son, 2 daughters, 2 grandchildren. She is a retired  who still works intermittently. Patient's daughter diagnosed with BCA at age of 49 in Dec 2015.   Patient has no breast complaints today.  She completed Moderna left arm March 2021. She is s/p right breast MRI bx retro 5/26/2021 pathology showed UDH, DSF, proliferative changes without atypia (buckle and stoplight marker). She had a fall in March 2021 at home and has been having right leg dysfunction. She is following with her orthopedic surgery Dr. Gee.  She has 3-4 espresso daily. She has lost her brother, brother-in-law, cousin, sister and sister-in law (passed of ALS). They passed of lung related issues due to heavy smoking. She is feeling, understandably, depressed from all this loss in 1 year.  She states she has no breast concerns today. Has musculoskeletal issues for which she is undergoing work up with Dr. Gee.  01/14/2025 - Lyn was diagnsoed with invasive ductal carcinoma of the left breast. Her screening mammogram and ultrasound for density (12/09/2024) showed heterogeneously dense breast tissue and a 7 x 7 mm developing mass in the left breast 3:00 N7 on mammogram. Ultrasound findings showed a left 3:00 N6 7 x 5 6 mm ill-defined hypoechoic mass. Ultrasound biopsy was recommended and done on 12/27/2024. Pathology of left 30:00 N6 (wing-shaped clip) showed IDC, grade 6/9, ER strongly positive in >90%, IA strongly positive in >90%, Her 2 negative and Ki67 11%.  Pathology also showed atypical lobular hyperplasia.   She is now s/p Left Pmtx/slne on 1/30/2025. Final pathology reported as 0/2 LN, IDC, 11mm, angio-lymphatic invasion seen, DCIS intermediated grade, LCIS, margins negative, ER/IA+, Her2-, Ki67 5-10%.  She is doing well postop. She was seen by Dr. Benjamin and RT was recommended: simulation scan followed by 4005 in 15 fractions to the partial left UOQ breast.  She is c/o fatigue. She is still thinking about endocrine therapy.  She does SBE occasionally.  She has not noticed a change in her breast or a breast lump. She has not noticed a change in her nipple or nipple area. She has not noticed a change in the skin of the breast. She is not experiencing nipple discharge. She is not experiencing breast pain. Patient complains of right breast occasional "pulling" She has not noticed a lump or lymph node under the armpit.   BREAST CANCER RISK FACTORS Menarche: 12 Menopause: 50 Grav:   3   Para: 3 Age at first live birth:20 Nursed:Y Hysterectomy: Y at 40 Oophorectomy:  OCP:N HRT: Y for 5 years Last pap/pelvic exam: 11/2019 WNL Related family history: daughter BCA @ 49 (lia), Martina bca @54yo Ashkenazi: N Matt-Ricardo/NCI lifetime risk: N/A BRCA testing: martina SEGAL genetics negative; micky (son) genetics negative  Last mammogram:  12/09/2024                             Location: [facility] Report reviewed. Results: Birads 4 The breasts are heterogeneously dense. Left 3:00 N7 7 x7 mm developing mass. Right breast WNL Arterial calcifications again present which could coronary artery disease.    Last ultrasound:  12/09/2025                             Location: [facility] Report reviewed Results: Birads 4 Left 3:00 N6 7 x 5 6 mm ill-defined hypoechoic mass correlating with mammogram findings. Rec: ultrasound biopsy  Last MRI:  5/5/2021                                            Location: [facility] Report Reviewed Results: right breast: new foci of retroareolar nodular enhancement; MR guided biopsy recommended. Fibrocystic changes. BIRADS 4 left breast: stable postsurgical changes. No suspicious findings. BIRADS 2

## 2025-03-19 NOTE — HISTORY OF PRESENT ILLNESS
[FreeTextEntry1] : Ms. Napier is a 78-year-old female with PMH of left breast DCIS intermediate grade s/p partial mastectomy ([facility]) & radiation ([facility]) in 2004. She did not have hormonal therapy. Patient is positive for anticardiolipin antibody. She is s/p left excision biopsy for an intraductal papilloma in 2006. Patient's daughter diagnosed with BCA at age of 49 in Dec 2015. Ms. Napier is now newly diagnosed with left breast invasive carcinoma s/p left partial mastectomy & SLNB with reconstruction.  12/09/24 Bilateral mammogram ([facility]) showed a left breast developing mass at 3:00. Right breast with no suspicious findings.  12/27/24 Left breast USGB ([facility]) revealed a moderately differentiated invasive ductal carcinoma measuring 0.4cm. Atypical lobular hyperplasia is also present. ER/[place]+ HER2-, Ki-67 11%  01/30/25 Left partial mastectomy with Dr. Norwood with tissue rearrangement with Dr. Wells. Pathology revealed a 1.1cm invasive ductal carcinoma, overall grade 2. Invasive carcinoma is 3.5mm from the closest anterior edge, 5mm from the closest medial edge & 7mm from the closest superior edge. Intermediate grade DCIS is present in cribriform & solid patterns. Closest margin is <1mm from the medial edge, 4mm from the posterior edge. Margin resections are negative for carcinoma. LCIS & LVI present. 2 lymph nodes negative. ER/WY+ HER2-, Ki-67 5-10%  Genetic testing negative.  02/19/25 Ms. Napier presents today to discuss the role of radiation in her management. She is almost 3 weeks s/p lumpectomy and is healing well. She denies breast pain but is having some mild difficulty with her ROM.  3/19/2025 Ms. Napier presents today for her OTV, completed 3/15 fractions to the Left breast to a dose of 801 cGy. She is overall feeling well. She is using Calendula cream BID. She denies any pain or discomfort.

## 2025-03-19 NOTE — DISEASE MANAGEMENT
[Pathological] : TNM Stage: p [IA] : IA [TTNM] : 1b [NTNM] : 0 [MTNM] : - [de-identified] : 512 [de-identified] : 0734

## 2025-03-19 NOTE — PHYSICAL EXAM
[] : no respiratory distress [Exaggerated Use Of Accessory Muscles For Inspiration] : no accessory muscle use [Nondistended] : nondistended [Normal] : oriented to person, place and time, the affect was normal, the mood was normal and not anxious [de-identified] : no skin changes or edema to treated left breast

## 2025-03-25 NOTE — DISEASE MANAGEMENT
[Pathological] : TNM Stage: p [IA] : IA [TTNM] : 1b [NTNM] : 0 [MTNM] : - [de-identified] : 1861 [de-identified] : 8170

## 2025-03-25 NOTE — HISTORY OF PRESENT ILLNESS
[FreeTextEntry1] : Ms. Napier is a 78-year-old female with PMH of left breast DCIS intermediate grade s/p partial mastectomy ([facility]) & radiation ([facility]) in 2004. She did not have hormonal therapy. Patient is positive for anticardiolipin antibody. She is s/p left excision biopsy for an intraductal papilloma in 2006. Patient's daughter diagnosed with BCA at age of 49 in Dec 2015. Ms. Napier is now newly diagnosed with left breast invasive carcinoma s/p left partial mastectomy & SLNB with reconstruction.  12/09/24 Bilateral mammogram ([facility]) showed a left breast developing mass at 3:00. Right breast with no suspicious findings.  12/27/24 Left breast USGB ([facility]) revealed a moderately differentiated invasive ductal carcinoma measuring 0.4cm. Atypical lobular hyperplasia is also present. ER/UT+ HER2-, Ki-67 11%  01/30/25 Left partial mastectomy with Dr. Norwood with tissue rearrangement with Dr. Wells. Pathology revealed a 1.1cm invasive ductal carcinoma, overall grade 2. Invasive carcinoma is 3.5mm from the closest anterior edge, 5mm from the closest medial edge & 7mm from the closest superior edge. Intermediate grade DCIS is present in cribriform & solid patterns. Closest margin is <1mm from the medial edge, 4mm from the posterior edge. Margin resections are negative for carcinoma. LCIS & LVI present. 2 lymph nodes negative. ER/UT+ HER2-, Ki-67 5-10%  Genetic testing negative.  02/19/25 Ms. Napier presents today to discuss the role of radiation in her management. She is almost 3 weeks s/p lumpectomy and is healing well. She denies breast pain but is having some mild difficulty with her ROM.  3/19/2025 Ms. Napier presents today for her OTV, completed 3/15 fractions to the Left breast to a dose of 801 cGy. She is overall feeling well. She is using Calendula cream BID. She denies any pain or discomfort.  3/25/2025 Ms. Napier presents today for her OTV, completed 7/15 fractions to the Left breast to a dose of 1869 cGy. She is using Calendula cream BID and she states that it works well. She denies any swelling, itching, or pain.

## 2025-03-25 NOTE — PHYSICAL EXAM
[] : no respiratory distress [Exaggerated Use Of Accessory Muscles For Inspiration] : no accessory muscle use [Nondistended] : nondistended [Normal] : oriented to person, place and time, the affect was normal, the mood was normal and not anxious [de-identified] : very faint erythema within RT field

## 2025-04-01 NOTE — HISTORY OF PRESENT ILLNESS
[FreeTextEntry1] : Ms. Napier is a 78-year-old female with PMH of left breast DCIS intermediate grade s/p partial mastectomy ([facility]) & radiation ([facility]) in 2004. She did not have hormonal therapy. Patient is positive for anticardiolipin antibody. She is s/p left excision biopsy for an intraductal papilloma in 2006. Patient's daughter diagnosed with BCA at age of 49 in Dec 2015. Ms. Napier is now newly diagnosed with left breast invasive carcinoma s/p left partial mastectomy & SLNB with reconstruction.  12/09/24 Bilateral mammogram ([facility]) showed a left breast developing mass at 3:00. Right breast with no suspicious findings.  12/27/24 Left breast USGB ([facility]) revealed a moderately differentiated invasive ductal carcinoma measuring 0.4cm. Atypical lobular hyperplasia is also present. ER/GA+ HER2-, Ki-67 11%  01/30/25 Left partial mastectomy with Dr. Norwood with tissue rearrangement with Dr. Wells. Pathology revealed a 1.1cm invasive ductal carcinoma, overall grade 2. Invasive carcinoma is 3.5mm from the closest anterior edge, 5mm from the closest medial edge & 7mm from the closest superior edge. Intermediate grade DCIS is present in cribriform & solid patterns. Closest margin is <1mm from the medial edge, 4mm from the posterior edge. Margin resections are negative for carcinoma. LCIS & LVI present. 2 lymph nodes negative. ER/GA+ HER2-, Ki-67 5-10%  Genetic testing negative.  02/19/25 Ms. Napier presents today to discuss the role of radiation in her management. She is almost 3 weeks s/p lumpectomy and is healing well. She denies breast pain but is having some mild difficulty with her ROM.  3/19/2025 Ms. Napier presents today for her OTV, completed 3/15 fractions to the Left breast to a dose of 801 cGy. She is overall feeling well. She is using Calendula cream BID. She denies any pain or discomfort.  3/25/2025 Ms. Napier presents today for her OTV, completed 7/15 fractions to the Left breast to a dose of 1869 cGy. She is using Calendula cream BID and she states that it works well. She denies any swelling, itching, or pain.   4/1/25 Fx 12 No redness or irritation is noted.  She is using Calendula lotion twice a day as directed.  Her appetite remains good and her weight stable.

## 2025-04-01 NOTE — DISEASE MANAGEMENT
[Pathological] : TNM Stage: p [IA] : IA [TTNM] : 1b [NTNM] : 0 [MTNM] : - [de-identified] : 4164 [de-identified] : 0631 [de-identified] : Left breast IMRT

## 2025-04-01 NOTE — PHYSICAL EXAM
[] : no respiratory distress [Exaggerated Use Of Accessory Muscles For Inspiration] : no accessory muscle use [Nondistended] : nondistended [Musculoskeletal - Swelling] : no joint swelling [Normal] : no focal deficits [de-identified] : no skin effects or edema noted to treated left breast

## 2025-05-19 NOTE — HISTORY OF PRESENT ILLNESS
[FreeTextEntry1] : Ms. Napier is a 78-year-old female with PMH of left breast DCIS intermediate grade s/p partial mastectomy ([facility]) & radiation ([facility]) in 2004. She did not have hormonal therapy. Patient is positive for anticardiolipin antibody. She is s/p left excision biopsy for an intraductal papilloma in 2006. Patient's daughter diagnosed with BCA at age of 49 in Dec 2015. Ms. Napier is now newly diagnosed with left breast invasive carcinoma s/p left partial mastectomy & SLNB with reconstruction. She completed 15 fractions of radiation therapy to her left partial breast to a dose of 4005 cGy on 04/04/25.  Patient is scheduled for a follow-up with Dr. Wells on 08/05/25.  Patient is scheduled for a follow-up with Dr. Norwood on 06/17/25.  Patient has started taking Anastrozole under the care of Dr. Becker. She states that she is feeling tired and developed a nonproductive cough since she started Anastrozole. She has a scheduled follow-up with Dr. Becker in 1 month.  05/19/25 Ms. Napier presents today for PTE s/p radiation therapy to her left partial breast on 04/04/25. Patient has been using Calendula. She reports occasional sharp shooting pain on her left breast but denies any erythema or ROM difficulties.

## 2025-05-19 NOTE — DISEASE MANAGEMENT
[Pathological] : TNM Stage: p [IA] : IA [TTNM] : 1b [NTNM] : 0 [MTNM] : - [de-identified] : 3286 [de-identified] : 4979 [de-identified] : left partial breast completed on 4/4/25

## 2025-05-19 NOTE — REVIEW OF SYSTEMS
[Fatigue] : fatigue [Negative] : Allergic/Immunologic [Edema Limbs: Grade 0] : Edema Limbs: Grade 0  [Fatigue: Grade 1 - Fatigue relieved by rest] : Fatigue: Grade 1 - Fatigue relieved by rest [Pruritus: Grade 0] : Pruritus: Grade 0 [Skin Hyperpigmentation: Grade 0] : Skin Hyperpigmentation: Grade 0 [Dermatitis Radiation: Grade 0] : Dermatitis Radiation: Grade 0 [Skin Rash] : no skin rash [Hot Flashes] : no hot flashes [de-identified] : left breast indentation

## 2025-05-19 NOTE — HISTORY OF PRESENT ILLNESS
[FreeTextEntry1] : Ms. Napier is a 78-year-old female with PMH of left breast DCIS intermediate grade s/p partial mastectomy ([facility]) & radiation ([facility]) in 2004. She did not have hormonal therapy. Patient is positive for anticardiolipin antibody. She is s/p left excision biopsy for an intraductal papilloma in 2006. Patient's daughter diagnosed with BCA at age of 49 in Dec 2015. Ms. Napier is now newly diagnosed with left breast invasive carcinoma s/p left partial mastectomy & SLNB with reconstruction. She completed 15 fractions of radiation therapy to her left partial breast to a dose of 4005 cGy on 04/04/25.  Patient is scheduled for a follow-up with Dr. Wells on 08/05/25.  Patient is scheduled for a follow-up with Dr. Norwood on 06/17/25.  Patient has started taking Anastrozole under the care of Dr. Becker. She states that she is feeling tired and developed a nonproductive cough since she started Anastrozole. She has a scheduled follow-up with Dr. Becker in 1 month.  05/19/25 Ms. Napier presents today for PTE s/p radiation therapy to her left partial breast on 04/04/25. Patient has been using Calendula. She reports occasional sharp shooting pain on her left breast but denies any erythema or ROM difficulties.

## 2025-05-19 NOTE — DISEASE MANAGEMENT
[Pathological] : TNM Stage: p [IA] : IA [TTNM] : 1b [NTNM] : 0 [MTNM] : - [de-identified] : 1183 [de-identified] : 3941 [de-identified] : left partial breast completed on 4/4/25

## 2025-05-19 NOTE — PHYSICAL EXAM
[Hearing Threshold Finger Rub Not [place]] : hearing was normal [] : no respiratory distress [Respiration, Rhythm And Depth] : normal respiratory rhythm and effort [Exaggerated Use Of Accessory Muscles For Inspiration] : no accessory muscle use [Heart Rate And Rhythm] : heart rate and rhythm were normal [Arterial Pulses Normal] : the arterial pulses were normal [Breast Abnormal Lactation (Galactorrhea)] : no nipple discharge [No UE Edema] : there is no upper extremity edema [Abdomen Soft] : soft [Nondistended] : nondistended [Supraclavicular Lymph Nodes Enlarged Bilaterally] : supraclavicular [Axillary Lymph Nodes Enlarged Bilaterally] : axillary [Normal] : oriented to person, place and time, the affect was normal, the mood was normal and not anxious [de-identified] : well-healed left breast incision; left breast fibrosis noted; left breast smaller than right

## 2025-05-19 NOTE — REVIEW OF SYSTEMS
[Fatigue] : fatigue [Negative] : Allergic/Immunologic [Edema Limbs: Grade 0] : Edema Limbs: Grade 0  [Fatigue: Grade 1 - Fatigue relieved by rest] : Fatigue: Grade 1 - Fatigue relieved by rest [Pruritus: Grade 0] : Pruritus: Grade 0 [Skin Hyperpigmentation: Grade 0] : Skin Hyperpigmentation: Grade 0 [Dermatitis Radiation: Grade 0] : Dermatitis Radiation: Grade 0 [Skin Rash] : no skin rash [Hot Flashes] : no hot flashes [de-identified] : left breast indentation

## 2025-05-19 NOTE — PHYSICAL EXAM
[Hearing Threshold Finger Rub Not [place]] : hearing was normal [] : no respiratory distress [Respiration, Rhythm And Depth] : normal respiratory rhythm and effort [Exaggerated Use Of Accessory Muscles For Inspiration] : no accessory muscle use [Heart Rate And Rhythm] : heart rate and rhythm were normal [Arterial Pulses Normal] : the arterial pulses were normal [Breast Abnormal Lactation (Galactorrhea)] : no nipple discharge [No UE Edema] : there is no upper extremity edema [Abdomen Soft] : soft [Nondistended] : nondistended [Supraclavicular Lymph Nodes Enlarged Bilaterally] : supraclavicular [Axillary Lymph Nodes Enlarged Bilaterally] : axillary [Normal] : oriented to person, place and time, the affect was normal, the mood was normal and not anxious [de-identified] : well-healed left breast incision; left breast fibrosis noted; left breast smaller than right

## 2025-05-30 NOTE — HISTORY OF PRESENT ILLNESS
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 Patient is s/p RTX. Patient did not have hormonal therapy. She is positive for anticardiolipin antibody. Patient is s/p left excision biopsy for an intraductal papilloma in 2006. She also states she had an excisional biopsy on her right at [facility] around 1999. She is of [place] descent from [facility]. She has 3 children 1 son, 2 daughters, 2 grandchildren. She is a retired  who still works intermittently. Patient's daughter diagnosed with BCA at age of 49 in Dec 2015.   Patient has no breast complaints today.  She completed Moderna left arm March 2021. She is s/p right breast MRI bx retro 5/26/2021 pathology showed UDH, DSF, proliferative changes without atypia (buckle and stoplight marker). She had a fall in March 2021 at home and has been having right leg dysfunction. She is following with her orthopedic surgery Dr. Gee.  She has 3-4 espresso daily. She has lost her brother, brother-in-law, cousin, sister and sister-in law (passed of ALS). They passed of lung related issues due to heavy smoking. She is feeling, understandably, depressed from all this loss in 1 year.  She states she has no breast concerns today. Has musculoskeletal issues for which she is undergoing work up with Dr. Gee.  01/14/2025 - Lyn was diagnsoed with invasive ductal carcinoma of the left breast. Her screening mammogram and ultrasound for density (12/09/2024) showed heterogeneously dense breast tissue and a 7 x 7 mm developing mass in the left breast 3:00 N7 on mammogram. Ultrasound findings showed a left 3:00 N6 7 x 5 6 mm ill-defined hypoechoic mass. Ultrasound biopsy was recommended and done on 12/27/2024. Pathology of left 30:00 N6 (wing-shaped clip) showed IDC, grade 6/9, ER strongly positive in >90%, WV strongly positive in >90%, Her 2 negative and Ki67 11%.  Pathology also showed atypical lobular hyperplasia.   She is now s/p Left Pmx/slne on 1/30/2025 with closure by Dr. Wells. Final pathology reported as 0/2 LN, IDC, 11mm, angio-lymphatic invasion seen, DCIS intermediate grade, LCIS, margins negative, ER/WV+, Her2-, Ki67 5-10%.  She was seen by Dr. Benjamin, she completed 15 fractions of radiation therapy to her left partial breast to a dose of 4005 cGy on 04/04/25. Patient has started taking Anastrozole under the care of Dr. Becker. She is tolerating it over well.  She does SBE occasionally.  She has not noticed a change in her breast or a breast lump. She has not noticed a change in her nipple or nipple area. She has not noticed a change in the skin of the breast. She is not experiencing nipple discharge. She is not experiencing breast pain. Patient complains of right breast occasional "pulling" She has not noticed a lump or lymph node under the armpit.   BREAST CANCER RISK FACTORS Menarche: 12 Menopause: 50 Grav:   3   Para: 3 Age at first live birth:20 Nursed:Y Hysterectomy: Y at 40 Oophorectomy:  OCP:N HRT: Y for 5 years Last pap/pelvic exam: 11/2019 WNL Related family history: daughter BCA @ 49 (lia), Maritna bca @52yo Ashkenazi: N Tyrer-Ricardo/NCI lifetime risk: N/A BRCA testing: martina SEGAL genetics negative; micky (son) genetics negative  Last mammogram:  12/09/2024                             Location: [facility] Report reviewed. Results: Birads 4 The breasts are heterogeneously dense. Left 3:00 N7 7 x7 mm developing mass. Right breast WNL Arterial calcifications again present which could coronary artery disease.    Last ultrasound:  12/09/2024                            Location: [facility] Report reviewed Results: Birads 4 Left 3:00 N6 7 x 5 6 mm ill-defined hypoechoic mass correlating with mammogram findings. Rec: ultrasound biopsy  Last MRI:  5/5/2021                                            Location: [facility] Report Reviewed Results: right breast: new foci of retroareolar nodular enhancement; MR guided biopsy recommended. Fibrocystic changes. BIRADS 4 left breast: stable postsurgical changes. No suspicious findings. BIRADS 2

## 2025-05-30 NOTE — HISTORY OF PRESENT ILLNESS
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 Patient is s/p RTX. Patient did not have hormonal therapy. She is positive for anticardiolipin antibody. Patient is s/p left excision biopsy for an intraductal papilloma in 2006. She also states she had an excisional biopsy on her right at [facility] around 1999. She is of [place] descent from [facility]. She has 3 children 1 son, 2 daughters, 2 grandchildren. She is a retired  who still works intermittently. Patient's daughter diagnosed with BCA at age of 49 in Dec 2015.   Patient has no breast complaints today.  She completed Moderna left arm March 2021. She is s/p right breast MRI bx retro 5/26/2021 pathology showed UDH, DSF, proliferative changes without atypia (buckle and stoplight marker). She had a fall in March 2021 at home and has been having right leg dysfunction. She is following with her orthopedic surgery Dr. Gee.  She has 3-4 espresso daily. She has lost her brother, brother-in-law, cousin, sister and sister-in law (passed of ALS). They passed of lung related issues due to heavy smoking. She is feeling, understandably, depressed from all this loss in 1 year.  She states she has no breast concerns today. Has musculoskeletal issues for which she is undergoing work up with Dr. Gee.  01/14/2025 - Lyn was diagnsoed with invasive ductal carcinoma of the left breast. Her screening mammogram and ultrasound for density (12/09/2024) showed heterogeneously dense breast tissue and a 7 x 7 mm developing mass in the left breast 3:00 N7 on mammogram. Ultrasound findings showed a left 3:00 N6 7 x 5 6 mm ill-defined hypoechoic mass. Ultrasound biopsy was recommended and done on 12/27/2024. Pathology of left 30:00 N6 (wing-shaped clip) showed IDC, grade 6/9, ER strongly positive in >90%, AR strongly positive in >90%, Her 2 negative and Ki67 11%.  Pathology also showed atypical lobular hyperplasia.   She is now s/p Left Pmx/slne on 1/30/2025 with closure by Dr. Wells. Final pathology reported as 0/2 LN, IDC, 11mm, angio-lymphatic invasion seen, DCIS intermediate grade, LCIS, margins negative, ER/AR+, Her2-, Ki67 5-10%.  She was seen by Dr. Benjamin, she completed 15 fractions of radiation therapy to her left partial breast to a dose of 4005 cGy on 04/04/25. Patient has started taking Anastrozole under the care of Dr. Becker. She is tolerating it over well.  She does SBE occasionally.  She has not noticed a change in her breast or a breast lump. She has not noticed a change in her nipple or nipple area. She has not noticed a change in the skin of the breast. She is not experiencing nipple discharge. She is not experiencing breast pain. Patient complains of right breast occasional "pulling" She has not noticed a lump or lymph node under the armpit.   BREAST CANCER RISK FACTORS Menarche: 12 Menopause: 50 Grav:   3   Para: 3 Age at first live birth:20 Nursed:Y Hysterectomy: Y at 40 Oophorectomy:  OCP:N HRT: Y for 5 years Last pap/pelvic exam: 11/2019 WNL Related family history: daughter BCA @ 49 (lia), Martina bca @54yo Ashkenazi: N Tyrer-Ricardo/NCI lifetime risk: N/A BRCA testing: martina SEGAL genetics negative; micky (son) genetics negative  Last mammogram:  12/09/2024                             Location[facility] Report reviewed. Results: Birads 4 The breasts are heterogeneously dense. Left 3:00 N7 7 x7 mm developing mass. Right breast WNL Arterial calcifications again present which could coronary artery disease.    Last ultrasound:  12/09/2024                            Location: [facility] Report reviewed Results: Birads 4 Left 3:00 N6 7 x 5 6 mm ill-defined hypoechoic mass correlating with mammogram findings. Rec: ultrasound biopsy  Last MRI:  5/5/2021                                            Location[facility] Report Reviewed Results: right breast: new foci of retroareolar nodular enhancement; MR guided biopsy recommended. Fibrocystic changes. BIRADS 4 left breast: stable postsurgical changes. No suspicious findings. BIRADS 2

## 2025-05-30 NOTE — CONSULT LETTER
[Dear  ___] : Dear  [unfilled], [Courtesy Letter:] : I had the pleasure of seeing your patient, [unfilled], in my office today. [Please see my note below.] : Please see my note below. [Consult Closing:] : Thank you very much for allowing me to participate in the care of this patient.  If you have any questions, please do not hesitate to contact me. [Sincerely,] : Sincerely, [DrLink  ___] : Dr. HERNANDEZ [FreeTextEntry3] : Candace Norwood [place] [address]

## 2025-05-30 NOTE — HISTORY OF PRESENT ILLNESS
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 Patient is s/p RTX. Patient did not have hormonal therapy. She is positive for anticardiolipin antibody. Patient is s/p left excision biopsy for an intraductal papilloma in 2006. She also states she had an excisional biopsy on her right at [facility] around 1999. She is of [place] descent from [facility]. She has 3 children 1 son, 2 daughters, 2 grandchildren. She is a retired  who still works intermittently. Patient's daughter diagnosed with BCA at age of 49 in Dec 2015.   Patient has no breast complaints today.  She completed Moderna left arm March 2021. She is s/p right breast MRI bx retro 5/26/2021 pathology showed UDH, DSF, proliferative changes without atypia (buckle and stoplight marker). She had a fall in March 2021 at home and has been having right leg dysfunction. She is following with her orthopedic surgery Dr. Gee.  She has 3-4 espresso daily. She has lost her brother, brother-in-law, cousin, sister and sister-in law (passed of ALS). They passed of lung related issues due to heavy smoking. She is feeling, understandably, depressed from all this loss in 1 year.  She states she has no breast concerns today. Has musculoskeletal issues for which she is undergoing work up with Dr. Gee.  01/14/2025 - Lyn was diagnsoed with invasive ductal carcinoma of the left breast. Her screening mammogram and ultrasound for density (12/09/2024) showed heterogeneously dense breast tissue and a 7 x 7 mm developing mass in the left breast 3:00 N7 on mammogram. Ultrasound findings showed a left 3:00 N6 7 x 5 6 mm ill-defined hypoechoic mass. Ultrasound biopsy was recommended and done on 12/27/2024. Pathology of left 30:00 N6 (wing-shaped clip) showed IDC, grade 6/9, ER strongly positive in >90%, ID strongly positive in >90%, Her 2 negative and Ki67 11%.  Pathology also showed atypical lobular hyperplasia.   She is now s/p Left Pmx/slne on 1/30/2025 with closure by Dr. Wells. Final pathology reported as 0/2 LN, IDC, 11mm, angio-lymphatic invasion seen, DCIS intermediate grade, LCIS, margins negative, ER/ID+, Her2-, Ki67 5-10%.  She was seen by Dr. Benjamin, she completed 15 fractions of radiation therapy to her left partial breast to a dose of 4005 cGy on 04/04/25. Patient has started taking Anastrozole under the care of Dr. Becker. She is tolerating it over well.  She does SBE occasionally.  She has not noticed a change in her breast or a breast lump. She has not noticed a change in her nipple or nipple area. She has not noticed a change in the skin of the breast. She is not experiencing nipple discharge. She is not experiencing breast pain. Patient complains of right breast occasional "pulling" She has not noticed a lump or lymph node under the armpit.   BREAST CANCER RISK FACTORS Menarche: 12 Menopause: 50 Grav:   3   Para: 3 Age at first live birth:20 Nursed:Y Hysterectomy: Y at 40 Oophorectomy:  OCP:N HRT: Y for 5 years Last pap/pelvic exam: 11/2019 WNL Related family history: daughter BCA @ 49 (lia), Martina bca @52yo Ashkenazi: N Tyrer-Ricardo/NCI lifetime risk: N/A BRCA testing: martina SEGAL genetics negative; micky (son) genetics negative  Last mammogram:  12/09/2024                             Location[facility] Report reviewed. Results: Birads 4 The breasts are heterogeneously dense. Left 3:00 N7 7 x7 mm developing mass. Right breast WNL Arterial calcifications again present which could coronary artery disease.    Last ultrasound:  12/09/2024                            Location: [facility] Report reviewed Results: Birads 4 Left 3:00 N6 7 x 5 6 mm ill-defined hypoechoic mass correlating with mammogram findings. Rec: ultrasound biopsy  Last MRI:  5/5/2021                                            Location[facility] Report Reviewed Results: right breast: new foci of retroareolar nodular enhancement; MR guided biopsy recommended. Fibrocystic changes. BIRADS 4 left breast: stable postsurgical changes. No suspicious findings. BIRADS 2

## 2025-05-30 NOTE — ASSESSMENT
[FreeTextEntry1] : Pt doing well post op cont ai and surveillance per Dr. Becker We reviewed risk reduction strategies including maintaining a BMI <25, limiting red meat intake and alcoholic beverages to 3 per week and exercise (150 min/ week low intensity or 75 min/week high intensity). And maintaining a normal vitamin D level  and stress management strategies.  she will see Dr. Mandujano for HTN and possible diabetes plan /hever 12/2025 LDEX today wnl next 6 months She knows to call or return sooner should any concerns or questions arise.

## 2025-05-30 NOTE — PHYSICAL EXAM
[Normocephalic] : normocephalic [Atraumatic] : atraumatic [Supple] : supple [No Supraclavicular Adenopathy] : no supraclavicular adenopathy [Examined in the supine and seated position] : examined in the supine and seated position [Symmetrical] : symmetrical [No dominant masses] : no dominant masses in right breast  [No dominant masses] : no dominant masses left breast [No Nipple Retraction] : no left nipple retraction [No Nipple Discharge] : no left nipple discharge [No Axillary Lymphadenopathy] : no left axillary lymphadenopathy [No Edema] : no edema [No Rashes] : no rashes [No Ulceration] : no ulceration [de-identified] : healing lateral scar, no collections, moderate brawny induration c/w RTx, healed axillary incision [de-identified] : healing pmx site, healing axillary incision, no collections, no redness

## 2025-05-30 NOTE — CONSULT LETTER
[Dear  ___] : Dear  [unfilled], [Courtesy Letter:] : I had the pleasure of seeing your patient, [unfilled], in my office today. [Please see my note below.] : Please see my note below. [Consult Closing:] : Thank you very much for allowing me to participate in the care of this patient.  If you have any questions, please do not hesitate to contact me. [Sincerely,] : Sincerely, [DrLink  ___] : Dr. HERNANDEZ [FreeTextEntry3] : Candace [place] [address]

## 2025-05-30 NOTE — CONSULT LETTER
[Dear  ___] : Dear  [unfilled], [Courtesy Letter:] : I had the pleasure of seeing your patient, [unfilled], in my office today. [Please see my note below.] : Please see my note below. [Consult Closing:] : Thank you very much for allowing me to participate in the care of this patient.  If you have any questions, please do not hesitate to contact me. [Sincerely,] : Sincerely, [DrLink  ___] : Dr. HERNANDEZ [FreeTextEntry3] : Candace Norwood [place] [address]

## 2025-05-30 NOTE — PHYSICAL EXAM
[Normocephalic] : normocephalic [Atraumatic] : atraumatic [Supple] : supple [No Supraclavicular Adenopathy] : no supraclavicular adenopathy [Examined in the supine and seated position] : examined in the supine and seated position [Symmetrical] : symmetrical [No dominant masses] : no dominant masses in right breast  [No dominant masses] : no dominant masses left breast [No Nipple Retraction] : no left nipple retraction [No Nipple Discharge] : no left nipple discharge [No Axillary Lymphadenopathy] : no left axillary lymphadenopathy [No Edema] : no edema [No Rashes] : no rashes [No Ulceration] : no ulceration [de-identified] : healing pmx site, healing axillary incision, no collections, no redness [de-identified] : healing lateral scar, no collections, moderate brawny induration c/w RTx, healed axillary incision